# Patient Record
Sex: MALE | Race: BLACK OR AFRICAN AMERICAN | Employment: FULL TIME | URBAN - NONMETROPOLITAN AREA
[De-identification: names, ages, dates, MRNs, and addresses within clinical notes are randomized per-mention and may not be internally consistent; named-entity substitution may affect disease eponyms.]

---

## 2022-01-07 ENCOUNTER — HOSPITAL ENCOUNTER (EMERGENCY)
Age: 32
Discharge: HOME | End: 2022-01-07
Attending: EMERGENCY MEDICINE
Payer: MEDICAID

## 2022-01-07 VITALS
SYSTOLIC BLOOD PRESSURE: 144 MMHG | HEIGHT: 77 IN | OXYGEN SATURATION: 100 % | TEMPERATURE: 97.8 F | DIASTOLIC BLOOD PRESSURE: 69 MMHG | RESPIRATION RATE: 18 BRPM | BODY MASS INDEX: 20.07 KG/M2 | HEART RATE: 65 BPM | WEIGHT: 170 LBS

## 2022-01-07 DIAGNOSIS — K02.9 DENTAL DECAY: ICD-10-CM

## 2022-01-07 DIAGNOSIS — K08.89 PAIN, DENTAL: Primary | ICD-10-CM

## 2022-01-07 PROCEDURE — 99282 EMERGENCY DEPT VISIT SF MDM: CPT

## 2022-01-07 RX ORDER — OXYCODONE HYDROCHLORIDE AND ACETAMINOPHEN 5; 325 MG/1; MG/1
1 TABLET ORAL EVERY 6 HOURS PRN
Qty: 12 TABLET | Refills: 0 | Status: SHIPPED | OUTPATIENT
Start: 2022-01-07 | End: 2022-01-10

## 2022-01-07 RX ORDER — AMOXICILLIN AND CLAVULANATE POTASSIUM 875; 125 MG/1; MG/1
1 TABLET, FILM COATED ORAL 2 TIMES DAILY
Qty: 20 TABLET | Refills: 0 | Status: SHIPPED | OUTPATIENT
Start: 2022-01-07 | End: 2022-01-17

## 2022-01-07 ASSESSMENT — PAIN DESCRIPTION - ORIENTATION: ORIENTATION: LEFT;UPPER

## 2022-01-07 ASSESSMENT — PAIN DESCRIPTION - LOCATION: LOCATION: TEETH

## 2022-01-07 ASSESSMENT — PAIN DESCRIPTION - PAIN TYPE: TYPE: ACUTE PAIN

## 2022-01-07 ASSESSMENT — PAIN SCALES - GENERAL: PAINLEVEL_OUTOF10: 8

## 2022-01-07 NOTE — ED PROVIDER NOTES
[facility]    EMERGENCY DEPARTMENT ENCOUNTER     CHIEF COMPLAINT   Chief Complaint   Patient presents with    Dental Pain     Left upper, onset yesterday        HPI   Rgei Camacho is a 36 y.o. male who presents with dental pain localized in the left upper mandible area of the mouth, onset was several weeks ago but his tooth broke yesterday which is caused him increased pain. The duration has been constant since the onset. The pain quality is sharp. The pain worsens with chewing. The Pain Level: 8/10. They not had this before. Review of Systems  Respiratory: No SOB or dyspnea  GI: No vomiting  General: No fever    PAST MEDICAL & SURGICAL HISTORY   History reviewed. No pertinent past medical history. History reviewed. No pertinent surgical history. Current Medications    Allergies  No Known Allergies  Social & Family History  Social History     Socioeconomic History    Marital status: None     Spouse name: None    Number of children: None    Years of education: None    Highest education level: None   Occupational History    None   Tobacco Use    Smoking status: Current Every Day Smoker     Types: Cigars    Smokeless tobacco: Never Used   Substance and Sexual Activity    Alcohol use: None    Drug use: None    Sexual activity: None   Other Topics Concern    None   Social History Narrative    None     Social Determinants of Health     Financial Resource Strain:     Difficulty of Paying Living Expenses: Not on file   Food Insecurity:     Worried About Running Out of Food in the Last Year: Not on file    Alfreda of Food in the Last Year: Not on file   Transportation Needs:     Lack of Transportation (Medical): Not on file    Lack of Transportation (Non-Medical):  Not on file   Physical Activity:     Days of Exercise per Week: Not on file    Minutes of Exercise per Session: Not on file   Stress:     Feeling of Stress : Not on file   Social Connections:     Frequency of Communication with Friends and Family: Not on file    Frequency of Social Gatherings with Friends and Family: Not on file    Attends Yarsanism Services: Not on file    Active Member of Clubs or Organizations: Not on file    Attends Club or Organization Meetings: Not on file    Marital Status: Not on file   Intimate Partner Violence:     Fear of Current or Ex-Partner: Not on file    Emotionally Abused: Not on file    Physically Abused: Not on file    Sexually Abused: Not on file   Housing Stability:     Unable to Pay for Housing in the Last Year: Not on file    Number of [place] in the Last Year: Not on file    Unstable Housing in the Last Year: Not on file     History reviewed. No pertinent family history. PHYSICAL EXAM   VITAL SIGNS: BP (!) 144/69   Pulse 65   Temp 97.8 °F (36.6 °C)   Resp 18   Ht 6' 5\" (1.956 m)   Wt 170 lb (77.1 kg)   SpO2 100%   BMI 20.16 kg/m²    Constitutional: Well developed, well nourished  Dental: Left upper second rearmost molar cracked. HENT: Atraumatic, moist mucus membranes  Neck: supple, no JVD   Respiratory: no retractions   Cardiovascular: Reg rate, no murmurs  Integument: Skin is warm and dry   Neurologic: Alert & oriented, no slurred speech     ED COURSE/MEDICAL DECISION MAKING   Differential Diagnosis: Dental Abscess, nasal lacrimal duct infection, sinusitis, ear infection,TMJ syndrome, parotitis, Airway Obstruction, Shahram's Angina, Bacteremia/Sepsis, broken tooth. MDM: Patient with broken tooth which seems to be strong risk for infection. We will put patient on antibiotics and pain medications. I have advised the patient to get a dentist to get the rest of the tooth removed. FINAL IMPRESSION   1. Pain, dental    2. Dental decay          Plan: Oral analgesics, antibiotics, and follow up with a dentist as an outpatient.      Electronically signed by: Warren Perez MD, 1/7/2022 8:37 AM    (This note was completed wth a voice recognition program) Lane Mitchell MD  01/07/22 0016

## 2022-01-07 NOTE — LETTER
[facility] ED  [address] [place] [address] [place] [address]  Phone: [phone]  Fax: [phone]               January 7, 2022    Patient: David Willingham   YOB: 1990   Date of Visit: 1/7/2022       To Whom It May Concern:    David Baez was seen and treated in our emergency department on 1/7/2022. Tobias Rubio       Sincerely,       Mc Roberto RN         Signature:__________________________________

## 2022-01-07 NOTE — LETTER
[facility] ED  [address] [place] [address] [place] [address]  Phone: [phone]  Fax: [phone]               January 7, 2022    Patient: Veronica Ramirez   YOB: 1990   Date of Visit: 1/7/2022       To Whom It May Concern:    Gaby Blancas was seen and treated in our emergency department on 1/7/2022.       Sincerely,       Marty Cordova RN         Signature:__________________________________

## 2025-01-13 ENCOUNTER — APPOINTMENT (OUTPATIENT)
Dept: CT IMAGING | Age: 35
End: 2025-01-13
Payer: OTHER MISCELLANEOUS

## 2025-01-13 ENCOUNTER — HOSPITAL ENCOUNTER (EMERGENCY)
Age: 35
Discharge: HOME | End: 2025-01-13
Payer: OTHER MISCELLANEOUS

## 2025-01-13 VITALS
WEIGHT: 155 LBS | BODY MASS INDEX: 18.3 KG/M2 | HEART RATE: 56 BPM | DIASTOLIC BLOOD PRESSURE: 72 MMHG | OXYGEN SATURATION: 100 % | HEIGHT: 77 IN | RESPIRATION RATE: 16 BRPM | SYSTOLIC BLOOD PRESSURE: 127 MMHG | TEMPERATURE: 97.4 F

## 2025-01-13 DIAGNOSIS — S40.012A CONTUSION OF LEFT SHOULDER, INITIAL ENCOUNTER: ICD-10-CM
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
## 2025-01-13 DIAGNOSIS — S16.1XXA ACUTE CERVICAL MYOFASCIAL STRAIN, INITIAL ENCOUNTER: ICD-10-CM

## 2025-01-13 PROCEDURE — 72125 CT NECK SPINE W/O DYE: CPT

## 2025-01-13 PROCEDURE — 71250 CT THORAX DX C-: CPT

## 2025-01-13 PROCEDURE — 6370000000 HC RX 637 (ALT 250 FOR IP): Performed by: PHYSICIAN ASSISTANT

## 2025-01-13 PROCEDURE — 99284 EMERGENCY DEPT VISIT MOD MDM: CPT

## 2025-01-13 RX ORDER — ACETAMINOPHEN 325 MG/1
650 TABLET ORAL ONCE
Status: COMPLETED | OUTPATIENT
Start: 2025-01-13 | End: 2025-01-13

## 2025-01-13 RX ORDER — IBUPROFEN 600 MG/1
600 TABLET, FILM COATED ORAL ONCE
Status: COMPLETED | OUTPATIENT
Start: 2025-01-13 | End: 2025-01-13

## 2025-01-13 RX ADMIN — ACETAMINOPHEN 650 MG: 325 TABLET ORAL at 20:42

## 2025-01-13 RX ADMIN — IBUPROFEN 600 MG: 600 TABLET, FILM COATED ORAL at 20:42

## 2025-01-13 ASSESSMENT — LIFESTYLE VARIABLES
HOW OFTEN DO YOU HAVE A DRINK CONTAINING ALCOHOL: NEVER
HOW MANY STANDARD DRINKS CONTAINING ALCOHOL DO YOU HAVE ON A TYPICAL DAY: PATIENT DOES NOT DRINK

## 2025-01-13 ASSESSMENT — ENCOUNTER SYMPTOMS
VOMITING: 0
BACK PAIN: 0
COUGH: 0
SHORTNESS OF BREATH: 0
ABDOMINAL PAIN: 0
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
